# Patient Record
(demographics unavailable — no encounter records)

---

## 2025-03-31 NOTE — HISTORY OF PRESENT ILLNESS
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 was injured December 4, 2020 while lifting a heavy bucket of cement and injured his low back and left shoulder with extensive care with some delays due to COVID eventually had surgery on the left shoulder March 16, 2023 by  who still continues to  treat him Currently taking Tylenol or Advil has been getting regular injections in the right shoulder stopped working March 2022 He is right-handed does have high blood pressure, sleep apnea diabetes hemoglobin A1c 6.1 He is on Ozempic did have an injury to his right shoulder  10 years ago With right shoulder rotator cuff repair Limited standing 10 to 15 minutes stand 10 minutes walk 7 to 10 minutes. He does not smoke no drug allergies He has a planned visit with pain management Dr. Son and he did see a neurologist Does describe some spasm in the neck and back Reports his pain at rest and activity as 9/10

## 2025-03-31 NOTE — HISTORY OF PRESENT ILLNESS
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 4, 2020 while lifting a heavy bucket of cement and injured his low back and left shoulder with extensive care with some delays due to COVID eventually had surgery on the left shoulder March 16, 2023 by  who still continues to  treat him Currently taking Tylenol or Advil has been getting regular injections in the right shoulder stopped working March 2022 He is right-handed does have high blood pressure, sleep apnea diabetes hemoglobin A1c 6.1 He is on Ozempic did have an injury to his right shoulder  10 years ago With right shoulder rotator cuff repair Limited standing 10 to 15 minutes stand 10 minutes walk 7 to 10 minutes. He does not smoke no drug allergies He has a planned visit with pain management Dr. Son and he did see a neurologist Does describe some spasm in the neck and back Reports his pain at rest and activity as 9/10

## 2025-03-31 NOTE — IMAGING
[de-identified] : Pleasant knees examined some discomfort mildly overweight  Cervical spine good position limits in flexion extension and rotation with spasm some trapezius spasm reflexes brisk and symmetric both upper extremities  Positive impingement both shoulders some crepitus mild restrictions in motion  X-rays cervical spine today degenerative changes 456 X-rays right shoulder mild degenerative changes MRI left shoulder 7/29/2024 low-grade partial-thickness supraspinatus infraspinatus tearing status post rotator cuff repair, anterior labral tear intact biceps MRI left shoulder 1/31/2021: Type II acromion supraspinatus tendinosis erosion of labrum Operative report 3/16/2023 left shoulder left shoulder arthroscopy rotator cuff repair bursectomy and acromioplasty partial Tiera Thoracolumbar spine stiff limits in flexion extension no point tenderness pain with extension lower lumbar no scoliosis Positive straight leg on left mild dysesthesia left calf Both hips good motion Both knees mild crepitus medial tenderness good alignment no varus calf soft  X-ray lumbar spine 7/29/2024 mild degenerative changes most severe at L5-S1 MRI lumbar spine 2/23/2021: Multilevel degenerative disc disease Left ankle full motion negative anterior drawer Achilles intact

## 2025-03-31 NOTE — IMAGING
[de-identified] : Pleasant knees examined some discomfort mildly overweight  Cervical spine good position limits in flexion extension and rotation with spasm some trapezius spasm reflexes brisk and symmetric both upper extremities  Positive impingement both shoulders some crepitus mild restrictions in motion  X-rays cervical spine today degenerative changes 456 X-rays right shoulder mild degenerative changes MRI left shoulder 7/29/2024 low-grade partial-thickness supraspinatus infraspinatus tearing status post rotator cuff repair, anterior labral tear intact biceps MRI left shoulder 1/31/2021: Type II acromion supraspinatus tendinosis erosion of labrum Operative report 3/16/2023 left shoulder left shoulder arthroscopy rotator cuff repair bursectomy and acromioplasty partial Tiera Thoracolumbar spine stiff limits in flexion extension no point tenderness pain with extension lower lumbar no scoliosis Positive straight leg on left mild dysesthesia left calf Both hips good motion Both knees mild crepitus medial tenderness good alignment no varus calf soft  X-ray lumbar spine 7/29/2024 mild degenerative changes most severe at L5-S1 MRI lumbar spine 2/23/2021: Multilevel degenerative disc disease Left ankle full motion negative anterior drawer Achilles intact

## 2025-03-31 NOTE — REASON FOR VISIT
[FreeTextEntry2] : work related injury 12/4/2020 patient is here today for both shoulders, lumbar spine, both knees, cervical spine, left ankle and foot pain  Does not feel Mobic is helping MRI cervical spine not approved recently had injection left shoulder and lumbar spine by pain management

## 2025-03-31 NOTE — ASSESSMENT
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  He continues to be symptomatic.  Many activities of daily living are limited  he is being seen by pain management and neurology,  if any other diagnostics are performed such as nerve tests I asked that copies be provided he does expect to have an   Orthopedically he is stable I recommended Mobic as an anti-inflammatory and have suggested an MRI of the cervical spine I believe most of the pain down into the left calf and foot is radicular  I will see him back in a few months  Based on his stated history, current symptoms, physical findings and diagnostics reviewed it is my opinion that he is totally disabled unable to work

## 2025-06-30 NOTE — IMAGING
[de-identified] : Pleasant knees examined some discomfort mildly overweight  Cervical spine good position limits in flexion extension and rotation with spasm some trapezius spasm reflexes brisk and symmetric both upper extremities  Positive impingement both shoulders some crepitus mild restrictions in motion  X-rays cervical spine today degenerative changes 456 X-rays right shoulder mild degenerative changes MRI left shoulder 7/29/2024 low-grade partial-thickness supraspinatus infraspinatus tearing status post rotator cuff repair, anterior labral tear intact biceps MRI left shoulder 1/31/2021: Type II acromion supraspinatus tendinosis erosion of labrum Operative report 3/16/2023 left shoulder left shoulder arthroscopy rotator cuff repair bursectomy and acromioplasty partial Tiera Thoracolumbar spine stiff limits in flexion extension no point tenderness pain with extension lower lumbar no scoliosis Positive straight leg on left mild dysesthesia left calf Both hips good motion Both knees mild crepitus medial tenderness good alignment no varus calf soft  X-ray lumbar spine 7/29/2024 mild degenerative changes most severe at L5-S1 MRI lumbar spine 2/23/2021: Multilevel degenerative disc disease Left ankle full motion negative anterior drawer Achilles intact

## 2025-06-30 NOTE — ASSESSMENT
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  He remains symptomatic.  Many activities of daily living are limited  he is being seen by pain management and neurology,  if any other diagnostics are performed such as nerve tests, I asked that copies be provided he does expect to have an   Orthopedically he is stable I recommended Mobic as an anti-inflammatory and have suggested an MRI of the cervical spine I believe most of the pain down into the left calf and foot is radicular  I will see him back in a few months  Based on his stated history, current symptoms, physical findings and diagnostics reviewed it is my opinion that he is totally disabled unable to work

## 2025-06-30 NOTE — HISTORY OF PRESENT ILLNESS
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 4, 2020 while lifting a heavy bucket of cement and injured his low back and left shoulder with extensive care with some delays due to COVID eventually had surgery on the left shoulder March 16, 2023 by  who still continues to  treat him Currently taking Tylenol or Advil has been getting regular injections in the right shoulder stopped working March 2022 He is right-handed does have high blood pressure, sleep apnea diabetes hemoglobin A1c 6.1 He is on Ozempic did have an injury to his right shoulder  10 years ago With right shoulder rotator cuff repair Limited standing 10 to 15 minutes stand 10 minutes walk 7 to 10 minutes. He does not smoke no drug allergies He has a planned visit with pain management Dr. Son and he did see a neurologist Does describe some spasm in the neck and back Reports his pain at rest and activity as 9/10

## 2025-06-30 NOTE — REASON FOR VISIT
[FreeTextEntry2] : Patient is coming in for a follow up WC DOI 12/04/2020 cervical, lumbar spine and left shoulder. neck and back left ankle and foot pain no change PT not authorized requested by PM stopped mobic no help  stopped  cervical MRI not approved